# Patient Record
Sex: FEMALE | Race: WHITE
[De-identification: names, ages, dates, MRNs, and addresses within clinical notes are randomized per-mention and may not be internally consistent; named-entity substitution may affect disease eponyms.]

---

## 2020-06-02 ENCOUNTER — HOSPITAL ENCOUNTER (EMERGENCY)
Dept: HOSPITAL 97 - ER | Age: 60
Discharge: HOME | End: 2020-06-02
Payer: COMMERCIAL

## 2020-06-02 VITALS — DIASTOLIC BLOOD PRESSURE: 77 MMHG | SYSTOLIC BLOOD PRESSURE: 127 MMHG | OXYGEN SATURATION: 98 %

## 2020-06-02 VITALS — TEMPERATURE: 97.4 F

## 2020-06-02 DIAGNOSIS — Z88.8: ICD-10-CM

## 2020-06-02 DIAGNOSIS — R07.9: Primary | ICD-10-CM

## 2020-06-02 LAB
ALBUMIN SERPL BCP-MCNC: 3.6 G/DL (ref 3.4–5)
ALP SERPL-CCNC: 66 U/L (ref 45–117)
ALT SERPL W P-5'-P-CCNC: 25 U/L (ref 12–78)
AST SERPL W P-5'-P-CCNC: 12 U/L (ref 15–37)
BUN BLD-MCNC: 14 MG/DL (ref 7–18)
GLUCOSE SERPLBLD-MCNC: 118 MG/DL (ref 74–106)
HCT VFR BLD CALC: 42.7 % (ref 36–45)
INR BLD: 1.03
LYMPHOCYTES # SPEC AUTO: 1.3 K/UL (ref 0.7–4.9)
MAGNESIUM SERPL-MCNC: 2.2 MG/DL (ref 1.8–2.4)
NT-PROBNP SERPL-MCNC: 29 PG/ML (ref ?–125)
PMV BLD: 8.1 FL (ref 7.6–11.3)
POTASSIUM SERPL-SCNC: 3.8 MMOL/L (ref 3.5–5.1)
RBC # BLD: 4.79 M/UL (ref 3.86–4.86)
TROPONIN (EMERG DEPT USE ONLY): < 0.02 NG/ML (ref 0–0.04)

## 2020-06-02 PROCEDURE — 85025 COMPLETE CBC W/AUTO DIFF WBC: CPT

## 2020-06-02 PROCEDURE — 84484 ASSAY OF TROPONIN QUANT: CPT

## 2020-06-02 PROCEDURE — 71045 X-RAY EXAM CHEST 1 VIEW: CPT

## 2020-06-02 PROCEDURE — 80076 HEPATIC FUNCTION PANEL: CPT

## 2020-06-02 PROCEDURE — 99285 EMERGENCY DEPT VISIT HI MDM: CPT

## 2020-06-02 PROCEDURE — 83735 ASSAY OF MAGNESIUM: CPT

## 2020-06-02 PROCEDURE — 83880 ASSAY OF NATRIURETIC PEPTIDE: CPT

## 2020-06-02 PROCEDURE — 93005 ELECTROCARDIOGRAM TRACING: CPT

## 2020-06-02 PROCEDURE — 80048 BASIC METABOLIC PNL TOTAL CA: CPT

## 2020-06-02 PROCEDURE — 85610 PROTHROMBIN TIME: CPT

## 2020-06-02 PROCEDURE — 36415 COLL VENOUS BLD VENIPUNCTURE: CPT

## 2020-06-02 NOTE — RAD REPORT
EXAM DESCRIPTION:  RAD - Chest Single View - 6/2/2020 9:46 am

 

CLINICAL HISTORY:  CHEST PAIN

Chest pain.

 

COMPARISON:  CHEST SINGLE VIEW dated 2/1/2016

 

FINDINGS:  Portable technique limits examination quality.

 

The lungs are grossly clear. The heart is normal in size. No displaced fractures.

 

IMPRESSION:  No acute intrathoracic process suspected.

## 2020-06-02 NOTE — EDPHYS
Physician Documentation                                                                           

 [facility]                                                                 

Name: Elo Goldsmith                                                                                

Age: 59 yrs                                                                                       

Sex: Female                                                                                       

: 1960                                                                                   

MRN: [id number]                                                                                   

Arrival Date: 2020                                                                          

Time: 09:12                                                                                       

Account#: [id number]                                                                            

Bed 3                                                                                             

Private MD: Olivia Jiang K                                                                     

ED Physician Toñito Patel                                                                      

HPI:                                                                                              

                                                                                             

09:35 This 59 yrs old  Female presents to ER via Ambulatory with complaints of Chest pm1 

      Pain.                                                                                       

09:35 The patient or guardian reports chest pain that is located primarily in the mid-sternal pm1 

      area. Onset: 4 day(s) ago. The pain does not radiate. Associated signs and symptoms:        

      The patient has no apparent associated signs or symptoms, Pertinent negatives:              

      abdominal pain, cough, diaphoresis, dizziness, headache, nausea, near syncope,              

      palpitations, shortness of breath, vomiting. The chest pain is described as a pressure.     

      Duration: The patient or guardian reports multiple episodes, on and off for the past 4      

      days. Starting last night present from dinner time until just prior to arrival. Chest       

      pain currently resolved. 0/10 pain. Modifying factors: The symptoms are alleviated by       

      nothing. the symptoms are aggravated by nothing. Severity of pain: in the emergency         

      department the pain has resolved is a 0 / 10. The patient has experienced a previous        

      episode, approximately 5 years ago.                                                         

                                                                                                  

Historical:                                                                                       

- Allergies:                                                                                      

09:19 Phenobarbital;                                                                          ss  

- PMHx:                                                                                           

09:19 Depression; High Cholesterol;                                                           ss  

- PSHx:                                                                                           

09:19 Lumpectomy; Hernia repair;                                                              ss  

                                                                                                  

- Immunization history:: Adult Immunizations up to date.                                          

- Social history:: Smoking status: Patient denies any tobacco usage or history of.                

                                                                                                  

                                                                                                  

ROS:                                                                                              

09:35 Constitutional: Negative for fever, chills, and weight loss, Respiratory: Negative for  pm1 

      shortness of breath, cough, wheezing, and pleuritic chest pain.                             

09:35 Neck: Negative for injury, pain, and swelling, Abdomen/GI: Negative for abdominal pain,     

      nausea, vomiting, diarrhea, and constipation, Back: Negative for injury and pain,           

      MS/Extremity: Negative for injury and deformity, Skin: Negative for injury, rash, and       

      discoloration, Neuro: Negative for headache, weakness, numbness, tingling, and seizure.     

09:35 Cardiovascular: Positive for chest pain, Negative for edema, orthopnea, palpitations.       

                                                                                                  

Exam:                                                                                             

09:35 Constitutional:  This is a well developed, well nourished patient who is awake, alert,  pm1 

      and in no acute distress. Head/Face:  Normocephalic, atraumatic. Neck:  Trachea             

      midline, no thyromegaly or masses palpated, and no cervical lymphadenopathy.  Supple,       

      full range of motion without nuchal rigidity, or vertebral point tenderness.  No            

      Meningismus. Chest/axilla:  Normal chest wall appearance and motion.  Nontender with no     

      deformity.  No lesions are appreciated.                                                     

09:35 Abdomen/GI:  Soft, non-tender, with normal bowel sounds.  No distension or tympany.  No     

      guarding or rebound.  No evidence of tenderness throughout. Back:  No spinal                

      tenderness.  No costovertebral tenderness.  Full range of motion. Skin:  Warm, dry with     

      normal turgor.  Normal color with no rashes, no lesions, and no evidence of cellulitis.     

      MS/ Extremity:  Pulses equal, no cyanosis.  Neurovascular intact.  Full, normal range       

      of motion.                                                                                  

09:35 Cardiovascular: Exam negative for  acute changes, Rate: normal, Rhythm: regular,            

      Pulses: no pulse deficits are appreciated, Heart sounds: normal, normal S1and S2,           

      Edema: is not appreciated.                                                                  

09:35 Respiratory: Exam negative for  acute changes, respiratory distress, shortness of           

      breath, wheezing.                                                                           

09:35 Neuro: Exam negative for acute changes, Orientation: is normal, Mentation: is normal,       

      Motor: is normal, moves all fours, Sensation: is normal, no obvious gross deficits.         

                                                                                                  

Vital Signs:                                                                                      

09:16  / 87; Pulse 90; Resp 15; Temp 97.4(TE); Pulse Ox 98% on R/A; Weight 77.11 kg;    ss  

      Height 5 ft. 7 in. (170.18 cm); Pain 2/10;                                                  

10:12  / 77; Pulse 67; Resp 12; Pulse Ox 100% on R/A;                                   mh5 

11:05  / 73; Pulse 64; Resp 13; Pulse Ox 98% on R/A;                                    sv  

12:00  / 78; Pulse 69; Resp 15; Pulse Ox 99% ;                                          sv  

12:56  / 70; Pulse 64; Resp 16; Pulse Ox 96% ;                                          sv  

14:10  / 77; Pulse 67; Resp 12; Pulse Ox 98% ;                                          sv  

09:16 Body Mass Index 26.63 (77.11 kg, 170.18 cm)                                             ss  

                                                                                                  

MDM:                                                                                              

09:29 Patient medically screened.                                                             pm1 

09:29 Data reviewed: vital signs. Data interpreted: Pulse oximetry: on room air is 98 %.      pm1 

      Interpretation: normal.                                                                     

11:03 Counseling: I had a detailed discussion with the patient and/or guardian regarding: lab pm1 

      results, radiology results, Heart score = 2. Would like to get 4 hour repeat troponin       

      in the ER. Discussed plan with patient for repeat troponin and she is agreeable to the      

      plan.                                                                                       

14:17 Counseling: I had a detailed discussion with the patient and/or guardian regarding: the pm1 

      historical points, exam findings, and any diagnostic results supporting the                 

      discharge/admit diagnosis, lab results, the need for outpatient follow up, to return to     

      the emergency department if symptoms worsen or persist or if there are any questions or     

      concerns that arise at home.                                                                

                                                                                                  

                                                                                             

09:30 Order name: Basic Metabolic Panel; Complete Time: 10:21                                 pm                                                                                             

09:30 Order name: CBC with Diff; Complete Time: 10:02                                         pm                                                                                             

09:30 Order name: LFT's; Complete Time: 10:21                                                 pm                                                                                             

09:30 Order name: Magnesium; Complete Time: 10:21                                             pm                                                                                             

09:30 Order name: NT PRO-BNP; Complete Time: 10:21                                            pm                                                                                             

09:30 Order name: PT-INR; Complete Time: 10:02                                                pm                                                                                             

09:30 Order name: Troponin (emerg Dept Use Only); Complete Time: 10:21                        pm                                                                                             

09:30 Order name: XRAY Chest (1 view); Complete Time: 10:02                                   pm                                                                                             

09:30 Order name: EKG; Complete Time: 09:31                                                   pm                                                                                             

09:30 Order name: Cardiac monitoring; Complete Time: 09:34                                    pm                                                                                             

09:30 Order name: EKG - Nurse/Tech; Complete Time: 09:49                                      pm                                                                                             

09:30 Order name: IV Saline Lock; Complete Time: 09:49                                        pm                                                                                             

09:30 Order name: Labs collected and sent; Complete Time: 09:49                               pm                                                                                             

11:23 Order name: Troponin (emerg Dept Use Only): draw at 1330; Complete Time: 14:17          sv  

                                                                                             

09:30 Order name: O2 Per Protocol; Complete Time: 09:34                                       pm1 

                                                                                             

09:30 Order name: O2 Sat Monitoring; Complete Time: 09:34                                     pm1 

                                                                                                  

Administered Medications:                                                                         

No medications were administered                                                                  

                                                                                                  

                                                                                                  

Disposition:                                                                                      

19:39 Co-signature as Attending Physician, Toñito Patel MD.                                 mh7 

                                                                                                  

Disposition:                                                                                      

20 14:17 Discharged to Home. Impression: Chest pain, unspecified.                           

- Condition is Stable.                                                                            

- Discharge Instructions: Nonspecific Chest Pain.                                                 

                                                                                                  

- Medication Reconciliation Form, Thank You Letter, Antibiotic Education, Prescription            

  Opioid Use form.                                                                                

- Follow up: Emergency Department; When: As needed; Reason: Worsening of condition.               

  Follow up: Private Physician; When: 2 - 3 days; Reason: Recheck today's complaints,             

  Continuance of care, Re-evaluation by your physician.                                           

- Problem is new.                                                                                 

- Symptoms have improved.                                                                         

                                                                                                  

                                                                                                  

                                                                                                  

Signatures:                                                                                       

Dispatcher [employer]                           EDMS                                                 

Dena Brown RN                      RN   ss                                                   

Billy Veras, ABIODUN                    NP   pm1                                                  

Charisse Spear RN                          RN   tw2                                                  

Toñito Patel MD MD   7                                                  

                                                                                                  

Corrections: (The following items were deleted from the chart)                                    

14:36 14:17 2020 14:17 Discharged to Home. Impression: Chest pain, unspecified.         tw2 

      Condition is Stable. Forms are Medication Reconciliation Form, Thank You Letter,            

      Antibiotic Education, Prescription Opioid Use. Follow up: Emergency Department; When:       

      As needed; Reason: Worsening of condition. Follow up: Private Physician; When: 2 - 3        

      days; Reason: Recheck today's complaints, Continuance of care, Re-evaluation by your        

      physician. Problem is new. Symptoms have improved. pm1                                      

                                                                                                  

**************************************************************************************************

## 2020-06-02 NOTE — ER
Nurse's Notes                                                                                     

 [facility]                                                                 

Name: Elo Goldsmith                                                                                

Age: 59 yrs                                                                                       

Sex: Female                                                                                       

: 1960                                                                                   

MRN: [id number]                                                                                   

Arrival Date: 2020                                                                          

Time: 09:12                                                                                       

Account#: [id number]                                                                            

Bed 3                                                                                             

Private MD: Olivia Jiang K                                                                     

Diagnosis: Chest pain, unspecified                                                                

                                                                                                  

Presentation:                                                                                     

                                                                                             

09:16 Chief complaint: Patient states: intermittent chest pressure that began 4 days ago.       

      Coronavirus screen: Proceed with normal triage. Patient denies a cough. Patient denies      

      shortness of breath or difficulty breathing. Patient denies measured and/or subjective      

      temperature greater than 100.4F prior to today's visit. Patient denies travel on a          

      cruise ship or to a country the [facility] currently lists as an affected area. Patient denies     

      contact with known and/or suspected case of COVID-19. Ebola Screen: Patient denies          

      exposure to infectious person. Patient denies travel to an Ebola-affected area in the       

      21 days before illness onset. Initial Sepsis Screen: Does the patient meet any 2            

      criteria? No. Patient's initial sepsis screen is negative. Does the patient have a          

      suspected source of infection? No. Patient's initial sepsis screen is negative. Risk        

      Assessment: Do you want to hurt yourself or someone else? Patient reports no desire to      

      harm self or others. Onset of symptoms was May 29, 2020.                                    

09:16 Method Of Arrival: Ambulatory                                                             

09:16 Acuity: ESPERANZA 3                                                                           ss  

                                                                                                  

Historical:                                                                                       

- Allergies:                                                                                      

09:19 Phenobarbital;                                                                          ss  

- PMHx:                                                                                           

09:19 Depression; High Cholesterol;                                                           ss  

- PSHx:                                                                                           

09:19 Lumpectomy; Hernia repair;                                                              ss  

                                                                                                  

- Immunization history:: Adult Immunizations up to date.                                          

- Social history:: Smoking status: Patient denies any tobacco usage or history of.                

                                                                                                  

                                                                                                  

Screenin:22 Abuse screen: Denies threats or abuse. Nutritional screening: No deficits noted.        tw2 

      Tuberculosis screening: No symptoms or risk factors identified. Fall Risk None              

      identified.                                                                                 

                                                                                                  

Assessment:                                                                                       

09:22 Pain: Pain began 4 days ago.                                                            tw2 

09:22 General: Appears in no apparent distress. slender, well groomed, Behavior is calm,      tw2 

      cooperative, appropriate for age. Pain: Complains of pain in xyphoid area and               

      mid-sternal area Pain does not radiate. Quality of pain is described as heavy,              

      pressure. Neuro: Level of Consciousness is awake, alert, obeys commands, Oriented to        

      person, place, time, situation. Cardiovascular: Denies shortness of breath, Heart tones     

      S1 S2 Patient's skin is warm and dry. Respiratory: Airway is patent Respiratory effort      

      is even, unlabored, Respiratory pattern is regular, symmetrical, Breath sounds are          

      clear bilaterally. GI: No signs and/or symptoms were reported involving the                 

      gastrointestinal system. Abdomen is flat, Bowel sounds present X 4 quads. : No signs      

      and/or symptoms were reported regarding the genitourinary system. EENT: No signs and/or     

      symptoms were reported regarding the EENT system. Derm: No signs and/or symptoms            

      reported regarding the dermatologic system. Musculoskeletal: Capillary refill < 3           

      seconds, Range of motion: intact in all extremities.                                        

10:43 Reassessment: Patient appears in no apparent distress at this time. No changes from     tw2 

      previously documented assessment. Patient and/or family updated on plan of care and         

      expected duration. Pain level reassessed. Patient is alert, oriented x 3, equal             

      unlabored respirations, skin warm/dry/pink.                                                 

11:45 Reassessment: Patient appears in no apparent distress at this time. No changes from     tw2 

      previously documented assessment. Patient and/or family updated on plan of care and         

      expected duration. Pain level reassessed. Patient is alert, oriented x 3, equal             

      unlabored respirations, skin warm/dry/pink.                                                 

12:45 Reassessment: Patient appears in no apparent distress at this time. No changes from     tw2 

      previously documented assessment. Patient and/or family updated on plan of care and         

      expected duration. Pain level reassessed. Patient is alert, oriented x 3, equal             

      unlabored respirations, skin warm/dry/pink.                                                 

13:45 Reassessment: Patient appears in no apparent distress at this time. No changes from     tw2 

      previously documented assessment. Patient and/or family updated on plan of care and         

      expected duration. Pain level reassessed. Patient is alert, oriented x 3, equal             

      unlabored respirations, skin warm/dry/pink.                                                 

14:34 Reassessment: Patient appears in no apparent distress at this time. No changes from     tw2 

      previously documented assessment. Patient and/or family updated on plan of care and         

      expected duration. Pain level reassessed. Patient is alert, oriented x 3, equal             

      unlabored respirations, skin warm/dry/pink.                                                 

                                                                                                  

Vital Signs:                                                                                      

09:16  / 87; Pulse 90; Resp 15; Temp 97.4(TE); Pulse Ox 98% on R/A; Weight 77.11 kg;    ss  

      Height 5 ft. 7 in. (170.18 cm); Pain 2/10;                                                  

10:12  / 77; Pulse 67; Resp 12; Pulse Ox 100% on R/A;                                   mh5 

11:05  / 73; Pulse 64; Resp 13; Pulse Ox 98% on R/A;                                    sv  

12:00  / 78; Pulse 69; Resp 15; Pulse Ox 99% ;                                          sv  

12:56  / 70; Pulse 64; Resp 16; Pulse Ox 96% ;                                          sv  

14:10  / 77; Pulse 67; Resp 12; Pulse Ox 98% ;                                          sv  

09:16 Body Mass Index 26.63 (77.11 kg, 170.18 cm)                                               

                                                                                                  

ED Course:                                                                                        

09:12 Patient arrived in ED.                                                                  mr  

09:12 Olivia Jiang MD is Private Physician.                                                mr  

09:18 Triage completed.                                                                       ss  

09:19 Arm band placed on right wrist.                                                         ss  

09:22 Charisse Spear, RN is Primary Nurse.                                                        tw2 

09:22 Placed in gown. Bed in low position. Cardiac monitor on. Pulse ox on. NIBP on.          tw2 

09:22 Patient maintains SpO2 saturation greater than 95% on room air.                         tw2 

09:23 Billy Veras NP is PHCP.                                                           pm1 

09:23 Toñito Patel MD is Attending Physician.                                             pm1 

09:29 Nurse Practitioner and/or Physician Assistant to see patient.                           sv  

09:35 Inserted saline lock: 20 gauge in left antecubital area, using aseptic technique. Blood sv  

      collected. Flushed left antecubital with 5 ml normal saline.                                

09:47 XRAY Chest (1 view) In Process Unspecified.                                             EDMS

09:49 EKG done, by ED staff, reviewed by Billy Veras NP.                                  at1 

14:34 No provider procedures requiring assistance completed. IV discontinued, intact,         tw2 

      bleeding controlled, No redness/swelling at site. Pressure dressing applied.                

                                                                                                  

Administered Medications:                                                                         

No medications were administered                                                                  

                                                                                                  

                                                                                                  

Outcome:                                                                                          

14:17 Discharge ordered by MD.                                                                pm1 

14:34 Discharged to home ambulatory.                                                          tw2 

14:34 Condition: stable                                                                           

14:34 Discharge instructions given to patient, Instructed on discharge instructions, follow       

      up and referral plans. Demonstrated understanding of instructions, follow-up care.          

14:36 Patient left the ED.                                                                    tw2 

                                                                                                  

Signatures:                                                                                       

Dispatcher [employer]                           January Barnett, RN                    Brigette Menjivar Shelby, RN                      RN   ss                                                   

Lilly Tracey, EKG Tech              EKG Tat1                                                  

Billy Veras, NP                    NP   pm1                                                  

Charisse Spear RN RN   2                                                  

Jerrica Jerez                              [facility]                                                  

                                                                                                  

**************************************************************************************************

## 2020-06-03 NOTE — EKG
Test Date:    2020-06-02               Test Time:    09:43:57

Technician:   MARIA ANTONIA                                    

                                                     

MEASUREMENT RESULTS:                                       

Intervals:                                           

Rate:         68                                     

KY:           148                                    

QRSD:         82                                     

QT:           394                                    

QTc:          418                                    

Axis:                                                

P:            57                                     

KY:           148                                    

QRS:          33                                     

T:            47                                     

                                                     

INTERPRETIVE STATEMENTS:                                       

                                                     

Normal sinus rhythm

Normal ECG

Compared to ECG 02/01/2016 17:29:50

No significant changes



Electronically Signed On 06-03-20 16:27:46 CDT by Moo Stevens